# Patient Record
Sex: FEMALE | Race: WHITE
[De-identification: names, ages, dates, MRNs, and addresses within clinical notes are randomized per-mention and may not be internally consistent; named-entity substitution may affect disease eponyms.]

---

## 2017-01-18 NOTE — ANESTHESIOLOGY PROGRESS NOTE
Anesthesia Post Op Note


Date & Time


Jan 18, 2017 at 10:27





Vital Signs


Pain Intensity:  0





 Vital Signs Past 12 Hours








  Date Time  Temp Pulse Resp B/P Pulse Ox O2 Delivery O2 Flow Rate FiO2


 


1/18/17 10:24  81 20 114/55 96 Room Air  





    160/83    


 


1/18/17 09:26 37 105 20 181/81 94 Room Air  





    160/83    











Notes


Mental Status:  alert / awake / arousable, participated in evaluation


Pt Amnestic to Procedure:  Yes


Nausea / Vomiting:  adequately controlled


Pain:  adequately controlled


Airway Patency, RR, SpO2:  stable & adequate


BP & HR:  stable & adequate


Hydration State:  stable & adequate


Anesthetic Complications:  no major complications apparent

## 2017-01-18 NOTE — GI REPORT
Procedure Date: 1/18/2017 9:27 AM

Procedure:            Colonoscopy

Indications:          Positive Cologuard test

Medicines:            Monitored Anesthesia Care

Complications:        No immediate complications.

Estimated Blood Loss: Estimated blood loss: none.

Procedure:            Pre-Anesthesia Assessment:

                      - Prior to the procedure, a History and Physical was 

                      performed, and patient medications and allergies were 

                      reviewed. The patient's tolerance of previous 

                      anesthesia was also reviewed. The risks and benefits of 

                      the procedure and the sedation options and risks were 

                      discussed with the patient. All questions were 

                      answered, and informed consent was obtained. Prior 

                      Anticoagulants: The patient has taken no previous 

                      anticoagulant or antiplatelet agents. ASA Grade 

                      Assessment: II - A patient with mild systemic disease. 

                      After reviewing the risks and benefits, the patient was 

                      deemed in satisfactory condition to undergo the 

                      procedure.

                      After I obtained informed consent, the scope was passed 

                      under direct vision. Throughout the procedure, the 

                      patient's blood pressure, pulse, and oxygen saturations 

                      were monitored continuously. The Scope was introduced 

                      through the anus and advanced to the terminal ileum. 

                      The colonoscopy was performed without difficulty. The 

                      patient tolerated the procedure well. The quality of 

                      the bowel preparation was good. The terminal ileum, 

                      ileocecal valve, appendiceal orifice, and rectum were 

                      photographed.

Findings:

     A 4 mm polyp was found in the cecum. The polyp was sessile. The polyp 

     was removed with a hot snare. Resection and retrieval were complete.

     A 10 mm polyp was found in the sigmoid colon. The polyp was 

     pedunculated. The polyp was removed with a hot snare. Resection and 

     retrieval were complete.

     Multiple small-mouthed diverticula were found in the sigmoid colon.

     Non-bleeding internal hemorrhoids were found during retroflexion. The 

     hemorrhoids were small.

Impression:           - One 4 mm polyp in the cecum, removed with a hot 

                      snare. Resected and retrieved.

                      - One 10 mm polyp in the sigmoid colon, removed with a 

                      hot snare. Resected and retrieved.

                      - Diverticulosis in the sigmoid colon.

                      - Non-bleeding internal hemorrhoids.

Recommendation:       - Resume previous diet.

                      - Continue present medications.

                      - Repeat colonoscopy for surveillance based on 

                      pathology results.

                      - Return to primary care physician as previously 

                      scheduled.

Eliseo Simpson DO

1/18/2017 10:26:42 AM

This report has been signed electronically.

Note Initiated On: 1/18/2017 9:27 AM

## 2017-01-18 NOTE — ENDO HISTORY AND PHYSICAL
History & Physical


Date of Service:


Jan 18, 2017.


Chief Complaint:


positive cologuard test


Referring Physician:


Radha BRISCOE


History of Present Illness


86 yo CF who presents for colonoscopy secondary to a positive cologuard test.





Past Surgical History


Hx Cardiac Surgery:  No


Hx Internal Defibrillator:  No


Hx Pacemaker:  No


Hx Abdominal Surgery:  No


Hx of Implantable Prosthesis:  No


Hx Post-Op Nausea and Vomiting:  No


Hx Cancer Surgery:  No


Hx Thoracic Surgery:  No


Hx Orthopedic:  Yes (RT/LEFT TKA, RT/LEFT FOOT TOE SX, RT CTR)


Hx Urinary Tract Surgery:  No





Family History


None





Social History


Smoking Status:  Never Smoker


Hx Substance Use:  No


Hx Alcohol Use:  Yes (OCCASSIONALLY)





Allergies


Coded Allergies:  


     No Known Allergies (Verified , 1/18/17)





Current Medications





 Reported Home Medications








 Medications  Dose


 Route/Sig


 Max Daily Dose Days Date Category


 


 Naprosyn


  (Naproxen) 500 Mg


 Tab  500 Mg


 PO BID PRN


    1/10/17 Reported


 


 Glucophage


  (Metformin Hcl)


 500 Mg Tab  500 Mg


 PO QPM


    1/10/17 Reported


 


 Advair Diskus


 250/50 60 Dose


  (Fluticasone


 Prop/Salmeterol)


 1 Ea Aerp  1 Puff


 INH QAM


    1/10/17 Reported











Vital Signs


Weight (Kilograms):  67.27


Height (Feet):  5


Height (Inches):  0











  Date Time  Temp Pulse Resp B/P Pulse Ox O2 Delivery O2 Flow Rate FiO2


 


1/18/17 09:26 37 105 20 181/81 94 Room Air  





    160/83    











Physical Exam


General Appearance:  WD/WN, no apparent distress


Respiratory/Chest:  


   Auscultation:  breath sounds normal


Cardiovascular:  


   Heart Auscultation:  RRR


Abdomen:  


   Bowel Sounds:  normal


   Inspection & Palpation:  soft, non-distended, no tenderness, guarding & 

rebound





Assessment and Plan


Assessment:


86 yo CF who presents for colonoscopy secondary to a positive cologuard test.








Plan:


Proceed with colonoscopy.

## 2017-01-18 NOTE — DISCHARGE INSTRUCTIONS
Endoscopy Patient Instructions


Date / Procedure(s) Performed


Jan 18, 2017.


Colonoscopy





Allergy Information


Coded Allergies:  


     No Known Allergies (Verified , 1/18/17)





Discharge Date / Findings


Jan 18, 2017.


Colon polyps


Diverticulosis


Internal hemorrhoids





Medication Instructions


Stopped Medication(s):  


stopped metformin on Monday


OK to resume all medications today as prescribed


 Reported Home Medications








 Medications  Dose


 Route/Sig


 Max Daily Dose Days Date Category


 


 Naprosyn


  (Naproxen) 500 Mg


 Tab  500 Mg


 PO BID PRN


    1/10/17 Reported


 


 Glucophage


  (Metformin Hcl)


 500 Mg Tab  500 Mg


 PO QPM


    1/10/17 Reported


 


 Advair Diskus


 250/50 60 Dose


  (Fluticasone


 Prop/Salmeterol)


 1 Ea Aerp  1 Puff


 INH QAM


    1/10/17 Reported











Provider Instructions





Activity Restrictions





-  No exercising or heavy lifting for 24 hours. 


-  Do not drink alcohol the day of the procedure.


-  Do not drive a car or operate machinery until the day after the procedure.


-  Do not make any important decisions or sign important papers in 24 hours 

after the procedure.





Following Day:





-  Return to full activity which may include returning to work/school.





Diet





Start your diet with liquids and light foods (jello, soup, juice, toast).  Then 

eat your usual diet if not nauseated.





Treatment For Common After Affects





For mild abdominal pain, bloating, or excessive gas:





-  Rest


-  Eat lightly


-  Lie on right side





Follow-Up Information


Follow-up with Radha BRISCOE as scheduled





Anesthesia Information





What You Should Know





You have had a procedure that required some medicine to reduce anxiety and 

discomfort. This treatment is called moderate sedation.  


After receiving the treatment, you may be sleepy, but you will be able to 

breathe on your own.  The effects of the treatment may last for several hours.








Follow these instructions along with Activity/Diet recommendations noted above:





*  Do NOT do anything where dizziness or clumsiness would be dangerous.





*  Rest quietly at home today, then you can be up and about tomorrow.





*  Have a responsible person stay with you the rest of today.





*  You may have had an I.V. today.  If so, you may take the dressing off later 

today.





Recommendations


 


Call your doctor if:





*  Trouble breathing 





*  Continuous vomiting for more than 24 hours





*  Temperature above 101 degrees





*  Severe abdominal pain or bloating





*  Pain not relieved by pain medicine ordered





*  There is increased drainage or redness from any incision





*  A large amount of rectal bleeding greater than 2-3 tablespoons. 


   (If you had a polyp/s removed or have hemorrhoids, a small amount of blood -


    from the rectum is to be expected.)





*  You have any unanswered questions or concerns.





IN THE EVENT OF A SERIOUS EMERGENCY, GO TO THE NEAREST EMERGENCY ROOM





       Your discharge instructions were prepared by provider Eliseo Simpson.


 Patient Instructions Signature Page








Paula Varghese 











Patient (or Guardian) Signature/Date:____________________________________ I 

have read and understand the instructions given to me by my caregivers.








Caregiver/RN/Doctor Signature/Date:____________________________________








The above-named patient and/or guardian has received patient instructions on 

this date.


























+  Original Patient Signature Page (only) stays with chart.  Please make copy 

for patient.

## 2018-04-19 ENCOUNTER — HOSPITAL ENCOUNTER (OUTPATIENT)
Dept: HOSPITAL 45 - C.MRI | Age: 83
Discharge: HOME | End: 2018-04-19
Attending: PHYSICIAN ASSISTANT
Payer: COMMERCIAL

## 2018-04-19 DIAGNOSIS — M54.5: Primary | ICD-10-CM

## 2018-04-19 DIAGNOSIS — M48.061: ICD-10-CM

## 2018-04-19 NOTE — DIAGNOSTIC IMAGING REPORT
MRI LUMBAR SPINE W/O CONTRAST



CLINICAL HISTORY: Low back pain with right leg radiculopathy.    



TECHNIQUE: Sagittal and axial T1, T2 and STIR images were obtained.



COMPARISON STUDY:  No previous studies for comparison. 



OBSERVATIONS:



There are extensive degenerative endplate signal changes at the L5-S1 level.

There is an 8mm T1 and T2 to hypointense lesion at the L2 level.



On sagittal images, disc bulges are visualized at the T10-11, and T11-T12

levels.



L1-2: In addition to a circumferential disc bulge, there is a small right

posterior lateral disc protrusion. There is no evidence of significant spinal or

foraminal stenosis.



L2-3: There is a disc osteophyte complex. There is moderate spinal stenosis.

There is mild left-sided foraminal narrowing



L3-4: There is a circumferential disc bulge with moderate spinal stenosis. There

is bilateral foraminal narrowing right more severe than left



L4-5: There is a circumferential disc bulge. There is extensive facet joint

arthropathy. There is moderate to severe spinal stenosis. There is bilateral

foraminal narrowing.



L5-S1: There is a circumferential disc bulge. There is facet joint arthropathy.

There is no significant spinal stenosis. There is bilateral foraminal narrowing.



The conus medullaris and cauda equina appear normal.



IMPRESSION: Moderately advanced multilevel spondylitic changes. This results in

moderate spinal stenosis at the L2-3 level, moderate spinal stenosis at the L3-4

level, and moderate to severe spinal stenosis at the L4-5 level. There is also

multilevel foraminal narrowing.







Electronically signed by:  Gucci Villar M.D.

4/19/2018 12:13 PM



Dictated Date/Time:  4/19/2018 12:08 PM